# Patient Record
Sex: FEMALE
[De-identification: names, ages, dates, MRNs, and addresses within clinical notes are randomized per-mention and may not be internally consistent; named-entity substitution may affect disease eponyms.]

---

## 2023-09-06 ENCOUNTER — NURSE TRIAGE (OUTPATIENT)
Dept: OTHER | Facility: CLINIC | Age: 23
End: 2023-09-06

## 2023-09-06 NOTE — TELEPHONE ENCOUNTER
Location of patient: [place]    Subjective: Caller states lower abdominal pain more tender on the right side that comes and goes. Pt some episodes of pain is severe. Unsure of what causing the pain. Nothing makes worse or better. Pt states that she has had episodes     Current Symptoms: Abdominal pain    Onset: 2 weeks ago; that started to become worse last night    Associated Symptoms: NA    Pain Severity: 5/10; sharp; waxing and waning    Temperature: Denies     What has been tried: Nothing    LMP:  2 weeks ago  Pregnant: No    Recommended disposition: Go to ED Now    Care advice provided, patient verbalizes understanding; denies any other questions or concerns; instructed to call back for any new or worsening symptoms. Patient/caller agrees to proceed to nearest Emergency Department per nursing judgement    This triage is a result of a call to [facility] [facility] [employer]. Please do not respond to the triage nurse through this encounter. Any subsequent communication should be directly with the patient.           Reason for Disposition   Patient sounds very sick or weak to the triager    Protocols used: Abdominal Pain - [facility]